# Patient Record
Sex: MALE | Race: WHITE
[De-identification: names, ages, dates, MRNs, and addresses within clinical notes are randomized per-mention and may not be internally consistent; named-entity substitution may affect disease eponyms.]

---

## 2017-02-25 ENCOUNTER — HOSPITAL ENCOUNTER (EMERGENCY)
Dept: HOSPITAL 45 - C.EDB | Age: 42
Discharge: HOME | End: 2017-02-25
Payer: OTHER GOVERNMENT

## 2017-02-25 VITALS
WEIGHT: 206.13 LBS | BODY MASS INDEX: 27.32 KG/M2 | BODY MASS INDEX: 27.32 KG/M2 | HEIGHT: 72.99 IN | WEIGHT: 206.13 LBS | HEIGHT: 72.99 IN

## 2017-02-25 VITALS
SYSTOLIC BLOOD PRESSURE: 163 MMHG | OXYGEN SATURATION: 100 % | HEART RATE: 73 BPM | TEMPERATURE: 98.06 F | DIASTOLIC BLOOD PRESSURE: 84 MMHG
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
## 2017-02-25 DIAGNOSIS — S61.011A: Primary | ICD-10-CM

## 2018-03-02 ENCOUNTER — HOSPITAL ENCOUNTER (EMERGENCY)
Dept: HOSPITAL 45 - C.EDB | Age: 43
Discharge: HOME | End: 2018-03-02
Payer: OTHER GOVERNMENT

## 2018-03-02 VITALS — HEART RATE: 74 BPM | OXYGEN SATURATION: 97 % | SYSTOLIC BLOOD PRESSURE: 114 MMHG | DIASTOLIC BLOOD PRESSURE: 62 MMHG

## 2018-03-02 VITALS — TEMPERATURE: 97.52 F

## 2018-03-02 VITALS
BODY MASS INDEX: 26.12 KG/M2 | HEIGHT: 72.99 IN | HEIGHT: 72.99 IN | BODY MASS INDEX: 26.12 KG/M2 | WEIGHT: 197.09 LBS | WEIGHT: 197.09 LBS

## 2018-03-02 DIAGNOSIS — N20.1: Primary | ICD-10-CM

## 2018-03-02 DIAGNOSIS — Z83.3: ICD-10-CM

## 2018-03-02 DIAGNOSIS — Z87.442: ICD-10-CM

## 2018-03-02 DIAGNOSIS — Z84.1: ICD-10-CM

## 2018-03-02 DIAGNOSIS — Z82.49: ICD-10-CM

## 2018-03-02 LAB
ALBUMIN SERPL-MCNC: 4.2 GM/DL (ref 3.4–5)
ALP SERPL-CCNC: 117 U/L (ref 45–117)
ALT SERPL-CCNC: 25 U/L (ref 12–78)
AST SERPL-CCNC: 15 U/L (ref 15–37)
BASOPHILS # BLD: 0.04 K/UL (ref 0–0.2)
BASOPHILS NFR BLD: 0.5 %
BUN SERPL-MCNC: 19 MG/DL (ref 7–18)
CALCIUM SERPL-MCNC: 9 MG/DL (ref 8.5–10.1)
CO2 SERPL-SCNC: 21 MMOL/L (ref 21–32)
CREAT SERPL-MCNC: 1.17 MG/DL (ref 0.6–1.4)
EOS ABS #: 0.07 K/UL (ref 0–0.5)
EOSINOPHIL NFR BLD AUTO: 332 K/UL (ref 130–400)
GLUCOSE SERPL-MCNC: 106 MG/DL (ref 70–99)
HCT VFR BLD CALC: 42.8 % (ref 42–52)
HGB BLD-MCNC: 15.4 G/DL (ref 14–18)
IG#: 0.02 K/UL (ref 0–0.02)
IMM GRANULOCYTES NFR BLD AUTO: 24.2 %
LIPASE: 254 U/L (ref 73–393)
LYMPHOCYTES # BLD: 1.83 K/UL (ref 1.2–3.4)
MCH RBC QN AUTO: 32.2 PG (ref 25–34)
MCHC RBC AUTO-ENTMCNC: 36 G/DL (ref 32–36)
MCV RBC AUTO: 89.5 FL (ref 80–100)
MONO ABS #: 0.54 K/UL (ref 0.11–0.59)
MONOCYTES NFR BLD: 7.1 %
NEUT ABS #: 5.06 K/UL (ref 1.4–6.5)
NEUTROPHILS # BLD AUTO: 0.9 %
NEUTROPHILS NFR BLD AUTO: 67 %
PMV BLD AUTO: 9.5 FL (ref 7.4–10.4)
POTASSIUM SERPL-SCNC: 4.2 MMOL/L (ref 3.5–5.1)
PROT SERPL-MCNC: 7.5 GM/DL (ref 6.4–8.2)
RED CELL DISTRIBUTION WIDTH CV: 13.1 % (ref 11.5–14.5)
RED CELL DISTRIBUTION WIDTH SD: 42.6 FL (ref 36.4–46.3)
SODIUM SERPL-SCNC: 140 MMOL/L (ref 136–145)
WBC # BLD AUTO: 7.56 K/UL (ref 4.8–10.8)

## 2018-03-02 NOTE — EMERGENCY ROOM VISIT NOTE
History


Report prepared by Timothy:  Bhavana Dumont


Under the Supervision of:  Dr. Jacob Kenny M.D.


First contact with patient:  06:40


Chief Complaint:  KIDNEY STONE


Stated Complaint:  KIDNEY STONE





History of Present Illness


The patient is a 42 year old male who presents to the Emergency Room with 

complaints of persistent left kidney pain starting 0400. He currently rates his 

discomfort as a 9.5/10 in severity. He states the pain feels like his previous 

kidney stone. His last kidney stone was 2 years ago and passed on its own. The 

pain radiates to his left testicle. He is nauseous. Pt denies LOC, headache, 

fevers, chills, diaphoresis, visual changes, neck pain, chest pain, breathing 

difficulties, vomiting, abdominal pain, melena, hematochezia, urinary symptoms, 

numbness, weakness, lymphadenopathy, rash, or other complaints.





   Source of History:  patient


   Onset:  0400


   Position:  other (left kidney)


   Symptom Intensity:  9.5/10


   Quality:  other (kidney stone)


   Timing:  other (persistent)


   Associated Symptoms:  + nausea


Note:


Pt reports left testicle pain.





Review of Systems


See HPI for pertinent positives and negatives.  A total of ten systems were 

reviewed and were otherwise negative.





Past Medical & Surgical


Medical Problems:


(1) Kidney stones








Family History





Cancer


Diabetes mellitus


Heart disease


Kidney stones





Social History


Smoking Status:  Never Smoker


Drug Use:  none


Marital Status:  


Housing Status:  lives with family


Occupation Status:  employed





Current/Historical Medications


Scheduled


Omeprazole (Prilosec), 20 MG PO DAILY


Tamsulosin Hcl (Flomax), 0.4 MG PO DAILY





Scheduled PRN


Ibuprofen Tab (Advil), 400 MG PO QID PRN for Pain


Oxycodone Ir (Roxicodone Ir), 1-2 TAB PO Q4H PRN for Pain


Promethazine Hcl (Phenergan), 25 MG PO Q6H PRN for Nausea





Allergies


Coded Allergies:  


     No Known Allergies (Unverified , 3/2/18)





Physical Exam


Vital Signs











  Date Time  Temp Pulse Resp B/P (MAP) Pulse Ox O2 Delivery O2 Flow Rate FiO2


 


3/2/18 11:38  74 18 114/62 97 Room Air  


 


3/2/18 10:26  50 16 135/74 99 Room Air  


 


3/2/18 09:38  49 17 145/94 99 Room Air  


 


3/2/18 08:00  45      


 


3/2/18 07:41  43 17 138/88 99 Room Air  


 


3/2/18 06:18 36.4 46 18 156/74 99 Room Air  











Physical Exam


GENERAL: Awake, alert, uncomfortable-appearing, in mild distress


HENT: Normocephalic, atraumatic. Oropharynx unremarkable.


EYES: Normal conjunctiva. Sclera non-icteric.


NECK: Supple. No nuchal rigidity. FROM. No masses.


RESPIRATORY: Clear to auscultation. No wheezes.


CARDIAC: Normal rate.  Normal rhythm. No murmurs.  No rubs. Extremities warm 

and well perfused. Pulses equal.  No JVD.


GI: Soft, non-distended. No tenderness to palpation. No rebound or guarding. No 

masses.


RECTAL: Deferred.


MUSCULOSKELETAL: Atraumatic. Chest examination reveals no tenderness. The back 

is symmetrical on inspection without obvious abnormality. There is left CVA 

tenderness to palpation. No joint edema. 


LOWER EXTREMITIES: Calves are equal size bilaterally and non-tender. No edema. 

No discoloration. 


NEURO: Normal sensorium. No sensory or motor deficits noted. 


SKIN: No rash or jaundice noted.





Medical Decision & Procedures


ER Provider


Diagnostic Interpretation:


Radiology results as stated below per my review and radiologist interpretation: 





ABD/PELVIS WITHOUT FOR STONE





CT DOSE: 799.86 mGy.cm





HISTORY: Flank pain  left flank pain





TECHNIQUE: Multiaxial CT images of the abdomen and pelvis were performed without


the use of intravenous and oral contrast according to the standard department


stone protocol.  A dose lowering technique was utilized adhering to the


principles of ALARA.





COMPARISON STUDY: 4/10/2016





FINDINGS: 2 mm calculus left ureterovesical junction essentially unchanged from


the prior study. Slight fullness left ureter and left renal collecting system


compared to the right. No significant hydronephrosis.





Liver spleen and pancreas are unremarkable. Bowel pattern is nonobstructive.


Normal appendix. No significant free fluid in the pelvic cul-de-sac.





IMPRESSION:  





1. Minimally obstructing 2 mm calculus left ureterovesical junction.








2. Slight fullness left renal collecting system and left ureter.


3. This study is virtually identical to the prior study of 4/10/2016 





The above report was generated using voice recognition software.  It may contain


grammatical, syntax or spelling errors.





Electronically signed by:  Dread Soares M.D.


3/2/2018 7:35 AM





Dictated Date/Time:  3/2/2018 7:28 AM





Laboratory Results


3/2/18 06:50








Red Blood Count 4.78, Mean Corpuscular Volume 89.5, Mean Corpuscular Hemoglobin 

32.2, Mean Corpuscular Hemoglobin Concent 36.0, Mean Platelet Volume 9.5, 

Neutrophils (%) (Auto) 67.0, Lymphocytes (%) (Auto) 24.2, Monocytes (%) (Auto) 

7.1, Eosinophils (%) (Auto) 0.9, Basophils (%) (Auto) 0.5, Neutrophils # (Auto) 

5.06, Lymphocytes # (Auto) 1.83, Monocytes # (Auto) 0.54, Eosinophils # (Auto) 

0.07, Basophils # (Auto) 0.04





3/2/18 06:50

















Test


  3/2/18


06:39 3/2/18


06:50


 


Urine Color DK YELLOW  


 


Urine Appearance TURBID (CLEAR)  


 


Urine pH 5.0 (4.5-7.5)  


 


Urine Specific Gravity


  1.033


(1.000-1.030) 


 


 


Urine Protein TRACE (NEG)  


 


Urine Glucose (UA) NEG (NEG)  


 


Urine Ketones NEG (NEG)  


 


Urine Occult Blood 3+ (NEG)  


 


Urine Nitrite NEG (NEG)  


 


Urine Bilirubin NEG (NEG)  


 


Urine Urobilinogen NEG (NEG)  


 


Urine Leukocyte Esterase NEG (NEG)  


 


Urine WBC (Auto) 1-5 /hpf (0-5)  


 


Urine RBC (Auto) >30 /hpf (0-4)  


 


Urine Hyaline Casts (Auto) 1-5 /lpf (0-5)  


 


Urine Epithelial Cells (Auto)


  5-10 /lpf


(0-5) 


 


 


Urine Bacteria (Auto) NEG (NEG)  


 


White Blood Count


  


  7.56 K/uL


(4.8-10.8)


 


Red Blood Count


  


  4.78 M/uL


(4.7-6.1)


 


Hemoglobin


  


  15.4 g/dL


(14.0-18.0)


 


Hematocrit  42.8 % (42-52) 


 


Mean Corpuscular Volume


  


  89.5 fL


()


 


Mean Corpuscular Hemoglobin


  


  32.2 pg


(25-34)


 


Mean Corpuscular Hemoglobin


Concent 


  36.0 g/dl


(32-36)


 


Platelet Count


  


  332 K/uL


(130-400)


 


Mean Platelet Volume


  


  9.5 fL


(7.4-10.4)


 


Neutrophils (%) (Auto)  67.0 % 


 


Lymphocytes (%) (Auto)  24.2 % 


 


Monocytes (%) (Auto)  7.1 % 


 


Eosinophils (%) (Auto)  0.9 % 


 


Basophils (%) (Auto)  0.5 % 


 


Neutrophils # (Auto)


  


  5.06 K/uL


(1.4-6.5)


 


Lymphocytes # (Auto)


  


  1.83 K/uL


(1.2-3.4)


 


Monocytes # (Auto)


  


  0.54 K/uL


(0.11-0.59)


 


Eosinophils # (Auto)


  


  0.07 K/uL


(0-0.5)


 


Basophils # (Auto)


  


  0.04 K/uL


(0-0.2)


 


RDW Standard Deviation


  


  42.6 fL


(36.4-46.3)


 


RDW Coefficient of Variation


  


  13.1 %


(11.5-14.5)


 


Immature Granulocyte % (Auto)  0.3 % 


 


Immature Granulocyte # (Auto)


  


  0.02 K/uL


(0.00-0.02)


 


Anion Gap


  


  8.0 mmol/L


(3-11)


 


Est Creatinine Clear Calc


Drug Dose 


  93.9 ml/min 


 


 


Estimated GFR (


American) 


  89.2 


 


 


Estimated GFR (Non-


American 


  77.0 


 


 


BUN/Creatinine Ratio  16.3 (10-20) 


 


Calcium Level


  


  9.0 mg/dl


(8.5-10.1)


 


Total Bilirubin


  


  1.0 mg/dl


(0.2-1)


 


Direct Bilirubin


  


  0.2 mg/dl


(0-0.2)


 


Aspartate Amino Transf


(AST/SGOT) 


  15 U/L (15-37) 


 


 


Alanine Aminotransferase


(ALT/SGPT) 


  25 U/L (12-78) 


 


 


Alkaline Phosphatase


  


  117 U/L


()


 


Total Protein


  


  7.5 gm/dl


(6.4-8.2)


 


Albumin


  


  4.2 gm/dl


(3.4-5.0)


 


Lipase


  


  254 U/L


()





Laboratory results reviewed by me





Medications Administered











 Medications


  (Trade)  Dose


 Ordered  Sig/Ginette


 Route  Start Time


 Stop Time Status Last Admin


Dose Admin


 


 Ondansetron HCl


  (Zofran Inj)  4 mg  NOW  STAT


 IV  3/2/18 06:59


 3/2/18 07:02 DC 3/2/18 07:10


4 MG


 


 Sodium Chloride  1,000 ml @ 


 999 mls/hr  Q1H1M STAT


 IV  3/2/18 06:59


 3/2/18 07:59 DC 3/2/18 07:12


999 MLS/HR


 


 Morphine Sulfate


  (MoRPHine


 SULFATE INJ)  8 mg  Q20M  PRN


 IV  3/2/18 07:00


 3/2/18 12:05 DC 3/2/18 07:10


8 MG


 


 Ketorolac


 Tromethamine


  (Toradol Inj)  15 mg  NOW  STAT


 IV  3/2/18 06:59


 3/2/18 07:02 DC 3/2/18 07:12


15 MG


 


 Morphine Sulfate


  (MoRPHine


 SULFATE INJ)  4 mg  NOW  STAT


 IV  3/2/18 07:40


 3/2/18 07:41 DC 3/2/18 07:45


4 MG


 


 Hydromorphone HCl


  (Dilaudid Inj)  0.5 mg  NOW  STAT


 IV  3/2/18 08:16


 3/2/18 08:17 DC 3/2/18 08:21


0.5 MG


 


 Hydromorphone HCl


  (Dilaudid Inj)  1 mg  Q15M  PRN


 IV  3/2/18 10:15


 3/2/18 12:05 DC 3/2/18 10:27


1 MG











ED Course


0658: The patient was evaluated in room A2. A complete history and physical 

exam was performed.





0659: Toradol Inj 15 mg IV, NSS 1000 ml @ 999 mls/hr IV, Zofran Inj 4 mg IV.





0700: Morphine Sulfate 8 mg IV.





0739: I reevaluated the patient. He is still having pain. 





0740: Morphine Sulfate 4 mg IV.





0816: Dilaudid Inj 0.5 mg IV.





1004: The patient is still complaining of pain.





1015: Dilaudid Inj 1 mg IV. 





1032: Upon reexamination, the patient was still having pain. I discussed the 

test results and treatment plan with him. The patient will be evaluated for 

further management.





1036: I discussed the patient's case with Nargis Jansen PA-C [facility]. The patient will be evaluated for further treatment and 

disposition.





1054: Nargis has evaluated the patient and he would now like to go home.





1119: I reevaluated the patient. Discussed results and discharge instructions: 

He verbalized understanding and agreement. The patient is ready for discharge.





Medical Decision


Prior records/ancillary studies reviewed.  The patient had a visit in 2016 for 

kidney stones.





Triage Nursing notes reviewed and agree them.





Additional history obtained from the family.





The patient's history was concerning for flank and abdominal pain.  





Differential diagnosis:


Etiologies such as renal colic, appendicitis, diverticulitis, mesenteric 

ischemia, aortic pathology, infections, inflammatory bowel disease, PUD, 

biliary pathology, UTI, as well as others were entertained.  





Physical examination findings:


As above.





ER treatment provided:


IV normal saline


IV Zofran


IV morphine 8 mg, morphine 4 mg


IV Toradol 15 mg


On reassessment the patient was still having discomfort


IV Dilaudid 0.5 mg 


On reassessment the patient felt better.





Diagnostic interpretation by me:


The labs revealed an unremarkable CBC and chemistry panel.  Urinalysis revealed 

hematuria.  There was no sign of UTI.





Imaging studies:


CT of the abdomen and pelvis as above.





It appears that the patient has isolated renal colic from a left sided stone.  

The patient was having more pain and the additional pain medication helped.  

Consultation was made with the hospitalist service.  The time they evaluated 

the patient he was feeling better and did not want to stay in the hospital.  He 

asked for medication for use as an outpatient with clinic follow-up.  This is 

felt to be reasonable.  The patient was given a prescription for Flomax, 

Phenergan, and oxycodone.  If he worsens in any way he will be back.I gave my 

usual and customary discussion regarding this issue.





By the evaluation outlined above other emergent etiologies such as those listed 

in the differential, as well as others, were deemed relatively unlikely.  





The patient was educated about the findings as listed above.  All questions 

were answered and the patient was pleased with the treatment.  Return 

instructions were outlined and the patient was discharged in stable condition.  





The patient was referred to Urology for follow-up for a recheck of the current 

condition.





Medication Reconcilliation


Current Medication List:  was personally reviewed by me





Blood Pressure Screening


Patient's blood pressure:  Elevated blood pressure


Blood pressure disposition:  Elevated BP felt to be situational





Consults


Time Called:  1028


Consulting Physician:  Nargis Jansen PA-C [facility] hospitalist


Returned Call:  [phone]


Discussed the patient's case. The patient will be evaluated for further 

treatment and disposition.





Impression





 Primary Impression:  


 Renal colic


 Additional Impression:  


 Left ureteral calculus





Scribe Attestation


The scribe's documentation has been prepared under my direction and personally 

reviewed by me in its entirety. I confirm that the note above accurately 

reflects all work, treatment, procedures, and medical decision making performed 

by me.





Departure Information


Dispostion


Home / Self-Care





Prescriptions





Promethazine Hcl (Phenergan) 25 Mg Tab


25 MG PO Q6H Y for Nausea, #10 TAB


   Prov: Jacob Kenny MD         3/2/18 


Tamsulosin Hcl (FLOMAX) 0.4 Mg Cap


0.4 MG PO DAILY, #10 CAP


   Prov: Jacob Kenny MD         3/2/18 


Oxycodone Ir (Roxicodone Ir) 5 Mg Tab


1-2 TAB PO Q4H Y for Pain, #15 TAB


   Prov: Jacob Kenny MD         3/2/18





Referrals


Wu Rojo D.O. (PCP)





Forms


HOME CARE DOCUMENTATION FORM,                                                 

               IMPORTANT VISIT INFORMATION





Patient Instructions


My [facility]





Additional Instructions





KIDNEY STONE INSTRUCTIONS:





Oxycodone Immediate Release (OxyIR) 5mg: Take 1-2 pills every four hours for 

pain.  Avoid alcohol, operating machinery or dangerous equipment, working on 

ladders or roofs, DRIVING, or situations where being under the influence may be 

dangerous.  It is recommended to use an over-the-counter stool softener such as 

Colace, 100mg twice daily while taking this medication to avoid constipation.





Phenergan 25mg:  Take one every six hours as needed for nausea. Avoid alcohol, 

operating machinery or dangerous equipment, working on ladders or roofs, DRIVING

, or situations where being under the influence may be dangerous.





Ibuprofen(Motrin, Advil) may be used for fever or pain.  Use 600mg every six 

hours as needed.  Take with food.  Avoid using more than 2400mg in a 24 hour 

period.  Do not use 2400mg per day for more than three consecutive days without 

physician direction.  Prolonged inappropriate use can lead to stomach upset or 

ulcers. This medication can be taken if you need to drive, work, or perform 

activities which may be dangerous when taking narcotic pain medication.


(AND/OR)


Acetaminophen(Tylenol) may be used for fever or pain.  Use 1000mg every six 

hours as needed.  Avoid using more than 4000mg in a 24 hour period.  This 

medication can be taken if you need to drive, work, or perform activities which 

may be dangerous when taking narcotic pain medication.





Strain your urine and collect all the stones or debris for the urologists.





Rest and avoid strenuous activity until your stone passes and symptoms resolve.


Drink plenty of fluids.





Return to the ER for worsening abdominal or back pain, vomiting, fevers, 

passing out, or as needed.





Follow up with [facility], call [phone], to arrange a 

visit.





Problem Qualifiers

## 2018-03-02 NOTE — DIAGNOSTIC IMAGING REPORT
ABD/PELVIS WITHOUT FOR STONE



CT DOSE: 799.86 mGy.cm



HISTORY: Flank pain  left flank pain



TECHNIQUE: Multiaxial CT images of the abdomen and pelvis were performed without

the use of intravenous and oral contrast according to the standard department

stone protocol.  A dose lowering technique was utilized adhering to the

principles of ALARA.



COMPARISON STUDY: 4/10/2016



FINDINGS: 2 mm calculus left ureterovesical junction essentially unchanged from

the prior study. Slight fullness left ureter and left renal collecting system

compared to the right. No significant hydronephrosis.



Liver spleen and pancreas are unremarkable. Bowel pattern is nonobstructive.

Normal appendix. No significant free fluid in the pelvic cul-de-sac.



IMPRESSION:  



1. Minimally obstructing 2 mm calculus left ureterovesical junction.





2. Slight fullness left renal collecting system and left ureter.

3. This study is virtually identical to the prior study of 4/10/2016 









The above report was generated using voice recognition software.  It may contain

grammatical, syntax or spelling errors.







Electronically signed by:  Dread Soares M.D.

3/2/2018 7:35 AM



Dictated Date/Time:  3/2/2018 7:28 AM

## 2018-03-21 NOTE — MEDICAL CONSULT
Consultation


Date of Consultation:


Mar 21, 2018.


Attending Physician:





Reason for Consultation:


Hospitalist evaluation - Possible Admission


History of Present Illness


Pt is 43 y/o M who presented to ER with c/o L flank pain with radiation to L 

groin that started this morning. Also c/o nausea. Hx kidney stones in past that 

pt reports has been able to pass on his own and this feels similar. Denies fever

/chills, diaphoresis, V/D/C, HA, dizziness, syncope, vision changes, neck pain, 

CP, SOB, orthopnea, palpitations, cough, sore throat, choking, otalgia, 

rhinorrhea, paresthesias, weakness, extremity weakness, extremity edema, rashes

, dysuria, hematuria, urinary frequency, urinary retention.





Past Medical/Surgical History


Medical Problems:


(1) Laceration of right thumb


Status: Acute  





(2) Left ureteral calculus


Status: Acute  





(3) Renal colic


Status: Acute  





(4) Renal colic on left side


Status: Acute  











Family History





Cancer


Diabetes mellitus


Heart disease


Kidney stones





Social History


Smoking Status:  Never Smoker


Drug Use:  none


Marital Status:  


Housing Status:  lives with family


Occupation Status:  employed





Allergies


Coded Allergies:  


     No Known Allergies (Unverified , 3/2/18)





Review of Systems


See HPI for pertinent positives & negatives. All other systems reviewed and 

were otherwise negative





Physical Exam


General Appearance:  WD/WN, no apparent distress (pt sitting up in chair)


Head:  normocephalic, atraumatic


Eyes:  normal inspection, sclerae normal


ENT:  hearing grossly normal, pharynx normal, + pertinent finding (mucous 

membranes moist)


Neck:  supple, trachea midline


Respiratory/Chest:  lungs clear, normal breath sounds, no respiratory distress


Cardiovascular:  regular rate, rhythm, no murmur


Abdomen/GI:  normal bowel sounds, soft, + pertinent finding (+mild tenderness 

to palpation L CVA, no other abdominal tenderness noted)


Back:  normal inspection


Extremities/Musculoskelatal:  normal inspection, normal range of motion


Neurologic/Psych:  alert, normal mood/affect, oriented x 3


Skin:  normal color, warm/dry





Laboratory Results


CT ABD/PELVIS:


IMPRESSION:  


1. Minimally obstructing 2 mm calculus left ureterovesical junction.


2. Slight fullness left renal collecting system and left ureter.


3. This study is virtually identical to the prior study of 4/10/2016 





Lab data reviewed





Assessment & Plan


RENAL COLIC


Pt with 2mm stone L UVJ, Labs WNL, U/A: +blood. Upon my evaluation pt reports 

pain is controlled, he appears in no apparent distress. Pt wishes to go home at 

this this time and try out pt trial, with understanding to return if worsening 

symptoms. Discussed with ER physician and ER physician will discharge pt home. 





Disposition


Discharge home 








ATTENDING ADDENDUM : 


in agreement with the assessment by Nargis Lara PA-C





41 YO M with non obstructive L renal stone at L UVJ -expected to pass 

spontaneously


at present pain free , no discomfort, no sign of infection 


stable to be discharged home form ER 





Neha Cohen MD